# Patient Record
(demographics unavailable — no encounter records)

---

## 2025-02-10 NOTE — IMAGING
[Right] : right knee [AP] : anteroposterior [Lateral] : lateral [AP Standing] : anteroposterior standing [Degenerative change] : Degenerative change [Advanced patellofemoral OA] : Advanced patellofemoral OA [de-identified] : Knee exam: Inspection: Varus deformity, large effusion, no warmth, no ecchymosis Palpation: Medial joint line tenderness to palpation, negative Fabricio Range of motion: 0-100 with significant anterior crepitus Strength: 5/5 quadriceps and hamstring strength Stability: Ligamentously stable Motor and sensory intact distally Gait: Mildly antalgic gait

## 2025-02-10 NOTE — HISTORY OF PRESENT ILLNESS
[7] : 7 [Dull/Aching] : dull/aching [Rest] : rest [Ice] : ice [Walking] : walking [Stairs] : stairs [de-identified] : 2.10.25 Patient here for right knee pain. Pain started on Saturday, no injury. Patient went to [facility] on Saturday where they took an x-ray. [FreeTextEntry6] : stiffness [de-identified] : bending the knee

## 2025-02-10 NOTE — ASSESSMENT
[FreeTextEntry1] : He presents for exacerbation of chronic moderate to severe right knee OA.  Prior injections in the past with relief.  I reviewed x-rays from the urgent care which showed advanced patellofemoral arthritis and mild to moderate medial OA.  Long discussion about OA diagnosis and prognosis.  Weight loss and low impact exercise discussed.  Prescription meloxicam 15 mg sent to the pharmacy to alleviate the inflammation associated with the arthritic flareup.  In order to afford immediate relief as he is quite uncomfortable from this today we discussed aspiration of the knee today.  90 cc of bloody fluid aspirated from the knee.  No injection was performed until MRI demonstrates why the hemarthrosis is present.  He is not on any anticoagulation aside from baby aspirin. The patient's current medication management of their orthopedic diagnosis was reviewed today: There is a moderate risk of morbidity with further treatment, especially from use of prescription strength medications and possible side effects of these medications which include upset stomach with oral medications, skin reactions to topical medications and cardiac/renal/diabetes issues with long term use.

## 2025-02-10 NOTE — PROCEDURE
[FreeTextEntry3] : - Large joint ASPIRATION was performed of the right knee.  - 90 CC Aspirated; Fluid appearance was heme - The indication for this procedure was pain and inflammation. Patient has tried OTC's including aspirin, Ibuprofen, Aleve, etc or prescription NSAIDS, and/or exercises at home and/or physical therapy without satisfactory response, patient had decreased mobility in the joint and the risks benefits, and alternatives have been discussed, and verbal consent was obtained. The site was prepped with alcohol, betadine, ethyl chloride sprayed topically and sterile technique used.  - Patient was advised to call if redness, pain or fever occur, apply ice for 15 minutes out of every hour for the next 12-24 hours as tolerated and patient was advised to rest the joint(s) for 2 days.  - Ultrasound guidance was indicated for this patient due to prior failure or difficult injection. All ultrasound images have been permanently captured and stored accordingly in our picture archiving and communication system. Visualization of the needle and placement of injection was performed without complication.

## 2025-02-24 NOTE — CARDIOLOGY SUMMARY
[de-identified] : 2/24/2025 Sinus Rhythm  Voltage criteria for LVH (R(I)+S(III) exceeds 2.50 mV) -Voltage criteria w/o ST/T abnormality may be normal. -consider old anterior infarct. ABNORMAL

## 2025-02-24 NOTE — DISCUSSION/SUMMARY
[Patient] : the patient [With Me] : with me [EKG obtained to assist in diagnosis and management of assessed problem(s)] : EKG obtained to assist in diagnosis and management of assessed problem(s) [___ Year(s)] : in [unfilled] year(s)

## 2025-02-24 NOTE — HISTORY OF PRESENT ILLNESS
[FreeTextEntry1] :   HTN   EST 5/31/2023 Negative exercise stress test  TTE 5/31/2023 LVEF 55. Mod Pulmonic regurgitation. RV function normal, PASP 26 mmHg.    ROS Denied dizziness, dyspnea, orthopnea, PND, edema, angina, palpitation, syncope, near syncope.  FUNCTIONAL CAPACITY: Est > 4.0 METS He reports regular exercise  CARDIAC MEDS Enalapril 10 mg daily   CHRONIC STABLE CONDITIONS: CARMELA: He uses mouthpiece.  Asthma: Stable, no exacerbation.  Borderline HDL: On diet   CARDIAC TESTING: Sleep Studies, 10/13/2014 Mild obstructive sleep apnea

## 2025-03-06 NOTE — ASSESSMENT
[FreeTextEntry1] : I personally reviewed and interpreted the MRI images in detail.  Worse arthritis than anticipated with associated synovitis which likely caused the underlying hemarthrosis.  No occult fracture noted.  No loose body noted.  Explained significance of the meniscal pathology/degeneration.  He would not qualify for any surgical arthroscopy or debridement at this stage.  He does feel better after the aspiration.  He will be started on maintenance therapy for the arthritis with meloxicam 15 mg prescription as needed as prescribed.  He will discuss this with his GP regarding long-term use.  If the symptoms recur we discussed corticosteroid injection and may also benefit from viscosupplementation down the line.  Continue with low impact exercise and strength training as tolerated.  May eventually progress and require total knee arthroplasty.  He understands all of this.  All questions answered.  The patient's current medication management of their orthopedic diagnosis was reviewed today: There is a moderate risk of morbidity with further treatment, especially from use of prescription strength medications and possible side effects of these medications which include upset stomach with oral medications, skin reactions to topical medications and cardiac/renal/diabetes issues with long term use.  -The patient's diagnosis was reviewed in detail. Explained this is a chronic, progressive deteriorating condition that may need treatment from time to time as the flare-ups occur. -The natural progression of Osteoarthritis was explained to the patient. We discussed the possible treatment options from conservative to operative. -We discussed prescription strength NSAIDs, Glucosamine and Chondroitin sulfate, and Physical Therapy as well different types of injections including viscosupplementation. -We also discussed that at some point the condition may progress and require a total knee replacement.

## 2025-03-06 NOTE — DATA REVIEWED
[MRI] : MRI [Right] : of the right [I independently reviewed and interpreted images and report] : I independently reviewed and interpreted images and report

## 2025-03-06 NOTE — IMAGING
[de-identified] : Knee Exam: Inspection: No effusion, no warmth, no ecchymosis Palpation: Medial joint line tenderness to palpation, negative Fabricio Range of motion: 0-130 with mild anterior crepitus Strength: 5/5 quadriceps and hamstring strength Stability: Ligamentously stable Motor and sensory intact distally Gait: Non antalgic gait

## 2025-03-06 NOTE — HISTORY OF PRESENT ILLNESS
[3] : 3 [1] : 2 [Dull/Aching] : dull/aching [Sharp] : sharp [de-identified] : 3/6/25: Patient is here to go over the MRI results of the left knee. Pain has been better since having an aspiration.  [] : Post Surgical Visit: no [FreeTextEntry1] : right knee  [de-identified] : MRI